# Patient Record
Sex: FEMALE | Race: WHITE | Employment: FULL TIME | URBAN - METROPOLITAN AREA
[De-identification: names, ages, dates, MRNs, and addresses within clinical notes are randomized per-mention and may not be internally consistent; named-entity substitution may affect disease eponyms.]

---

## 2022-03-18 PROBLEM — J45.20 MILD INTERMITTENT ASTHMA WITHOUT COMPLICATION: Status: ACTIVE | Noted: 2017-04-18

## 2022-03-19 PROBLEM — J30.9 ALLERGIC RHINITIS: Status: ACTIVE | Noted: 2017-04-18

## 2022-06-28 ENCOUNTER — HOSPITAL ENCOUNTER (OUTPATIENT)
Dept: LAB | Age: 32
Discharge: HOME | End: 2022-07-01

## 2022-06-28 ENCOUNTER — HOSPITAL ENCOUNTER (OUTPATIENT)
Dept: LAB | Age: 32
Discharge: HOME | End: 2022-07-01
Payer: COMMERCIAL

## 2022-06-28 ENCOUNTER — OFFICE VISIT (OUTPATIENT)
Dept: OCCUPATIONAL MEDICINE | Age: 32
End: 2022-06-28

## 2022-06-28 VITALS
SYSTOLIC BLOOD PRESSURE: 114 MMHG | WEIGHT: 131.4 LBS | OXYGEN SATURATION: 96 % | BODY MASS INDEX: 21.89 KG/M2 | HEART RATE: 102 BPM | HEIGHT: 65 IN | DIASTOLIC BLOOD PRESSURE: 64 MMHG | TEMPERATURE: 98.8 F | RESPIRATION RATE: 12 BRPM

## 2022-06-28 DIAGNOSIS — R68.89 FLU-LIKE SYMPTOMS: ICD-10-CM

## 2022-06-28 DIAGNOSIS — J02.9 SORE THROAT: Primary | ICD-10-CM

## 2022-06-28 LAB
BASOPHILS # BLD: 0 K/UL (ref 0–0.2)
BASOPHILS # BLD: 0 K/UL (ref 0–0.2)
BASOPHILS NFR BLD: 0 % (ref 0–2)
BASOPHILS NFR BLD: 0 % (ref 0–2)
DIFFERENTIAL METHOD BLD: ABNORMAL
DIFFERENTIAL METHOD BLD: ABNORMAL
EOSINOPHIL # BLD: 0 K/UL (ref 0–0.8)
EOSINOPHIL # BLD: 0 K/UL (ref 0–0.8)
EOSINOPHIL NFR BLD: 0 % (ref 0.5–7.8)
EOSINOPHIL NFR BLD: 0 % (ref 0.5–7.8)
ERYTHROCYTE [DISTWIDTH] IN BLOOD BY AUTOMATED COUNT: 14.3 % (ref 11.9–14.6)
ERYTHROCYTE [DISTWIDTH] IN BLOOD BY AUTOMATED COUNT: 14.4 % (ref 11.9–14.6)
GROUP A STREP ANTIGEN, POC: NEGATIVE
HCT VFR BLD AUTO: 41.1 % (ref 35.8–46.3)
HCT VFR BLD AUTO: 41.4 % (ref 35.8–46.3)
HETEROPH AB BLD QL IA: NEGATIVE
HGB BLD-MCNC: 13.6 G/DL (ref 11.7–15.4)
HGB BLD-MCNC: 13.6 G/DL (ref 11.7–15.4)
IMM GRANULOCYTES # BLD AUTO: 0 K/UL (ref 0–0.5)
IMM GRANULOCYTES # BLD AUTO: 0 K/UL (ref 0–0.5)
IMM GRANULOCYTES NFR BLD AUTO: 0 % (ref 0–5)
IMM GRANULOCYTES NFR BLD AUTO: 0 % (ref 0–5)
INFLUENZA A ANTIGEN, POC: NEGATIVE
INFLUENZA B ANTIGEN, POC: NEGATIVE
LYMPHOCYTES # BLD: 0.8 K/UL (ref 0.5–4.6)
LYMPHOCYTES # BLD: 0.9 K/UL (ref 0.5–4.6)
LYMPHOCYTES NFR BLD: 8 % (ref 13–44)
LYMPHOCYTES NFR BLD: 9 % (ref 13–44)
MCH RBC QN AUTO: 28 PG (ref 26.1–32.9)
MCH RBC QN AUTO: 28.2 PG (ref 26.1–32.9)
MCHC RBC AUTO-ENTMCNC: 32.9 G/DL (ref 31.4–35)
MCHC RBC AUTO-ENTMCNC: 33.1 G/DL (ref 31.4–35)
MCV RBC AUTO: 85.1 FL (ref 79.6–97.8)
MCV RBC AUTO: 85.4 FL (ref 79.6–97.8)
MONOCYTES # BLD: 0.8 K/UL (ref 0.1–1.3)
MONOCYTES # BLD: 0.9 K/UL (ref 0.1–1.3)
MONOCYTES NFR BLD: 8 % (ref 4–12)
MONOCYTES NFR BLD: 8 % (ref 4–12)
MONONUCLEOSIS SCREEN POC: POSITIVE
NEUTS SEG # BLD: 8.5 K/UL (ref 1.7–8.2)
NEUTS SEG # BLD: 8.6 K/UL (ref 1.7–8.2)
NEUTS SEG NFR BLD: 83 % (ref 43–78)
NEUTS SEG NFR BLD: 84 % (ref 43–78)
NRBC # BLD: 0 K/UL (ref 0–0.2)
NRBC # BLD: 0 K/UL (ref 0–0.2)
PLATELET # BLD AUTO: 255 K/UL (ref 150–450)
PLATELET # BLD AUTO: 256 K/UL (ref 150–450)
PMV BLD AUTO: 10.4 FL (ref 9.4–12.3)
PMV BLD AUTO: 10.6 FL (ref 9.4–12.3)
RBC # BLD AUTO: 4.83 M/UL (ref 4.05–5.2)
RBC # BLD AUTO: 4.85 M/UL (ref 4.05–5.2)
VALID INTERNAL CONTROL, POC: YES
VALID INTERNAL CONTROL: YES
WBC # BLD AUTO: 10.2 K/UL (ref 4.3–11.1)
WBC # BLD AUTO: 10.4 K/UL (ref 4.3–11.1)

## 2022-06-28 PROCEDURE — 86308 HETEROPHILE ANTIBODY SCREEN: CPT | Performed by: NURSE PRACTITIONER

## 2022-06-28 PROCEDURE — 87804 INFLUENZA ASSAY W/OPTIC: CPT | Performed by: NURSE PRACTITIONER

## 2022-06-28 PROCEDURE — 85025 COMPLETE CBC W/AUTO DIFF WBC: CPT

## 2022-06-28 PROCEDURE — 99214 OFFICE O/P EST MOD 30 MIN: CPT | Performed by: NURSE PRACTITIONER

## 2022-06-28 PROCEDURE — 87430 STREP A AG IA: CPT | Performed by: NURSE PRACTITIONER

## 2022-06-28 PROCEDURE — 86308 HETEROPHILE ANTIBODY SCREEN: CPT

## 2022-06-28 ASSESSMENT — ENCOUNTER SYMPTOMS
RHINORRHEA: 1
SORE THROAT: 1
CHOKING: 0
SINUS PAIN: 0
APNEA: 0
EYES NEGATIVE: 1
DIARRHEA: 0
STRIDOR: 0
SHORTNESS OF BREATH: 0
WHEEZING: 0
TROUBLE SWALLOWING: 0
CHEST TIGHTNESS: 0
NAUSEA: 0
VOICE CHANGE: 0
COUGH: 1
FACIAL SWELLING: 0
CONSTIPATION: 0
SINUS PRESSURE: 0

## 2022-06-28 NOTE — PROGRESS NOTES
HISTORY OF PRESENT ILLNESS  Allison Martinez is a 28 y.o. female     Presenting today with flu like symptoms. 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 On June 15th, 2022 (13 days ago) tested POSITIVE for COVID19 via home antigen test, asymptomatic at the time, tested after exposed to her fiance who tested POSITIVE on PCR. Note that over a week prior to her POSITIVE home test, she has laryngitis and a cough that resolved with a brief course of prednisone. She tested every 3 days for COVID during brief illness, NEGATIVE several times. Last night, started with new, sudden abrupt onset of fever of 102, body aches, chills, fatigue, congestion, sore throat, and cough, taking Ibuprofen, helping with fever, fever resolved this morning, still with chills and aches. She completed a virtual visit with her PCP and was Rx'd Doxycycline. Came here for further evaluation. Review of Systems   Constitutional: Positive for chills, fatigue and fever. HENT: Positive for congestion, rhinorrhea and sore throat. Negative for ear discharge, ear pain, facial swelling, hearing loss, mouth sores, nosebleeds, sinus pressure, sinus pain, sneezing, tinnitus, trouble swallowing and voice change. Eyes: Negative. Respiratory: Positive for cough. Negative for apnea, choking, chest tightness, shortness of breath, wheezing and stridor. Cardiovascular: Negative for chest pain. Gastrointestinal: Negative for constipation, diarrhea and nausea. Musculoskeletal: Positive for myalgias. Neurological: Positive for headaches. Negative for dizziness. History reviewed. No pertinent past medical history.   Social History     Socioeconomic History    Marital status: Single     Spouse name: Not on file    Number of children: Not on file    Years of education: Not on file    Highest education level: Not on file   Occupational History    Not on file   Tobacco Use    Smoking status: Never Smoker    Smokeless tobacco: Never Used   Substance and Sexual Activity    Alcohol use: Yes    Drug use: No    Sexual activity: Not on file     Comment: Mirena 6/8/17   Other Topics Concern    Not on file   Social History Narrative    Not on file     Social Determinants of Health     Financial Resource Strain:     Difficulty of Paying Living Expenses: Not on file   Food Insecurity:     Worried About Running Out of Food in the Last Year: Not on file    Coreen of Food in the Last Year: Not on file   Transportation Needs:     Lack of Transportation (Medical): Not on file    Lack of Transportation (Non-Medical):  Not on file   Physical Activity:     Days of Exercise per Week: Not on file    Minutes of Exercise per Session: Not on file   Stress:     Feeling of Stress : Not on file   Social Connections:     Frequency of Communication with Friends and Family: Not on file    Frequency of Social Gatherings with Friends and Family: Not on file    Attends Anabaptism Services: Not on file    Active Member of [address] [place] [address] or Organizations: Not on file    Attends Club or Organization Meetings: Not on file    Marital Status: Not on file   Intimate Partner Violence:     Fear of Current or Ex-Partner: Not on file    Emotionally Abused: Not on file    Physically Abused: Not on file    Sexually Abused: Not on file   Housing Stability:     Unable to Pay for Housing in the Last Year: Not on file    Number of [place] in the Last Year: Not on file    Unstable Housing in the Last Year: Not on file     Family History   Problem Relation Age of Onset    Breast Cancer Neg Hx     Ovarian Cancer Neg Hx     Cancer Father         prostate    Colon Cancer Neg Hx      Past Surgical History:   Procedure Laterality Date    CHOLECYSTECTOMY, LAPAROSCOPIC  2009    WISDOM TOOTH EXTRACTION       Immunization History   Administered Date(s) Administered    COVID-19, Antonia Cho, Primary or Immunocompromised, PF, 100mcg/0.5mL 01/05/2022    Influenza Joss Starch 09/24/2018  Influenza, Quadv, IM, PF (6 mo and older Fluzone, Flulaval, Fluarix, and 3 yrs and older Afluria) 09/11/2017     Current Outpatient Medications   Medication Sig Dispense Refill    albuterol sulfate  (90 Base) MCG/ACT inhaler 2 inhalations q6h prn wheezing      fluticasone (FLONASE) 50 MCG/ACT nasal spray 1 spray IEN bid Indications: Allergic Rhinitis      levonorgestrel (MIRENA) IUD 52 mg Placed June, 2016      montelukast (SINGULAIR) 10 MG tablet Take 10 mg by mouth daily      spironolactone (ALDACTONE) 25 MG tablet Take by mouth daily (Patient not taking: Reported on 6/28/2022)       No current facility-administered medications for this visit. Allergies   Allergen Reactions    Amoxicillin-Pot Clavulanate Hives       Physical Exam  Vitals reviewed. Constitutional:       General: She is awake. She is not in acute distress. Appearance: Normal appearance. She is well-developed and well-groomed. She is not ill-appearing or toxic-appearing. HENT:      Head: Normocephalic and atraumatic. Jaw: There is normal jaw occlusion. Right Ear: Ear canal and external ear normal. Tympanic membrane is scarred. Left Ear: Tympanic membrane, ear canal and external ear normal.      Nose: Mucosal edema and congestion present. Right Turbinates: Enlarged. Not swollen or pale. Left Turbinates: Enlarged. Not swollen or pale. Right Sinus: No maxillary sinus tenderness or frontal sinus tenderness. Left Sinus: No maxillary sinus tenderness or frontal sinus tenderness. Mouth/Throat:      Mouth: Mucous membranes are moist.      Pharynx: Oropharynx is clear. Uvula midline. Posterior oropharyngeal erythema present. No pharyngeal swelling, oropharyngeal exudate or uvula swelling. Tonsils: No tonsillar exudate or tonsillar abscesses. 1+ on the right. 1+ on the left. Eyes:      Extraocular Movements: Extraocular movements intact.       Conjunctiva/sclera: Conjunctivae normal. Pupils: Pupils are equal, round, and reactive to light. Neck:      Trachea: Trachea and phonation normal.   Cardiovascular:      Rate and Rhythm: Normal rate and regular rhythm. Pulses: Normal pulses. Heart sounds: Normal heart sounds, S1 normal and S2 normal.   Pulmonary:      Effort: Pulmonary effort is normal.      Breath sounds: Normal breath sounds and air entry. Musculoskeletal:         General: Normal range of motion. Cervical back: Full passive range of motion without pain, normal range of motion and neck supple. Lymphadenopathy:      Head:      Right side of head: No submental, submandibular, tonsillar, preauricular, posterior auricular or occipital adenopathy. Left side of head: No submental, submandibular, tonsillar, preauricular, posterior auricular or occipital adenopathy. Cervical: Cervical adenopathy present. Right cervical: Superficial cervical adenopathy present. Skin:     General: Skin is warm and dry. Capillary Refill: Capillary refill takes less than 2 seconds. Neurological:      General: No focal deficit present. Mental Status: She is alert and oriented to person, place, and time. Psychiatric:         Mood and Affect: Mood normal.         Behavior: Behavior normal. Behavior is cooperative. Thought Content:  Thought content normal.          /64 (Site: Left Upper Arm, Position: Sitting, Cuff Size: Medium Adult)   Pulse (!) 102   Temp 98.8 °F (37.1 °C) (Temporal)   Resp 12   Ht 5' 5\" (1.651 m)   Wt 131 lb 6.4 oz (59.6 kg)   SpO2 96%   BMI 21.87 kg/m²     Results for orders placed or performed in visit on 06/28/22   AMB POC RAPID INFLUENZA TEST   Result Value Ref Range    VALID INTERNAL CONTROL, POC yes     Influenza A Antigen, POC Negative Negative    Influenza B Antigen, POC Negative Negative   AMB POC RAPID STREP TEST   Result Value Ref Range    Group A Strep Antigen, POC Negative Negative   AMB POC MONO TEST   Result Value Ref Range    VALID INTERNAL CONTROL yes     MONONUCLEOSIS SCREEN POC positive      . ASSESSMENT and Grady Pelaez was seen today for pharyngitis. Diagnoses and all orders for this visit:    Sore throat  -     AMB POC RAPID INFLUENZA TEST  -     AMB POC RAPID STREP TEST    Flu-like symptoms  -     AMB POC RAPID INFLUENZA TEST  -     AMB POC MONO TEST  -     Mononucleosis Screen; Future  -     CBC with Auto Differential; Future      Rapid Strep and Flu NEGATIVE. The Mono test has a faint line, unsure if POSITIVE or NEGATIVE. Complete CBC and Mono screen for confirmation. Her last [facility] test was POSITIVE on 6/15 (13 days ago), asymptomatic at the time. Possibly now active? ? Recommended that she take another Home Antigen test that I cannot offer here at this [facility]. If POSITIVE, follow [facility] isolation, today starting day 1 of symptom onset. Patient Education:  May use cool-mist humidifier/Vaporizer at bedside/living quarters. Recommended saline rinses with OceanMist spray or using NettiPot. Encouraged increase fluid intake for adequate hydration. Reviewed good hand hygiene. Tylenol/Ibuprofen for aches/pains. Throat lozenges, honey, or warm salt water gargles for sore throat. Informed symptoms should last for 5-7 days but may last up to 2 weeks. Cough may persist after cold symptoms resolve. Risks and benefits of medications reviewed with patient . Follow up: 5 days if not any better or worse or development of high fever. Seek ER care for chest pain or SOB. Patient voices understanding and agrees with the plan of care as described above.      ---------------    Addendum  6/28/22 @ 2:14pm.    Mono screen NEGATIVE. Confirmed NO MONO.  CBC unremarkable. Called and reviewed results @ 2:30pm.  She reported taking  Another home COVID test and NEGATIVE. Has Doxy from her PCP to start. Move forward with starting and follow up as directed above.

## 2022-06-29 ENCOUNTER — TELEPHONE (OUTPATIENT)
Dept: OCCUPATIONAL MEDICINE | Age: 32
End: 2022-06-29

## 2022-06-29 NOTE — TELEPHONE ENCOUNTER
Courtesy Follow Up Call:    Left message for patient to call back with any questions or concerns regarding visit yesterday.

## 2022-09-28 ENCOUNTER — OFFICE VISIT (OUTPATIENT)
Dept: OBGYN CLINIC | Age: 32
End: 2022-09-28
Payer: COMMERCIAL

## 2022-09-28 VITALS
HEIGHT: 65 IN | SYSTOLIC BLOOD PRESSURE: 110 MMHG | BODY MASS INDEX: 22.33 KG/M2 | DIASTOLIC BLOOD PRESSURE: 60 MMHG | WEIGHT: 134 LBS

## 2022-09-28 DIAGNOSIS — Z30.432 ENCOUNTER FOR IUD REMOVAL: ICD-10-CM

## 2022-09-28 DIAGNOSIS — Z31.69 ENCOUNTER FOR PRECONCEPTION CONSULTATION: Primary | ICD-10-CM

## 2022-09-28 PROCEDURE — 58301 REMOVE INTRAUTERINE DEVICE: CPT | Performed by: OBSTETRICS & GYNECOLOGY

## 2022-09-28 PROCEDURE — 99213 OFFICE O/P EST LOW 20 MIN: CPT | Performed by: OBSTETRICS & GYNECOLOGY

## 2022-09-28 ASSESSMENT — ENCOUNTER SYMPTOMS
GASTROINTESTINAL NEGATIVE: 1
ALLERGIC/IMMUNOLOGIC NEGATIVE: 1
EYES NEGATIVE: 1
RESPIRATORY NEGATIVE: 1

## 2022-09-28 NOTE — PROGRESS NOTES
Name: Nick Lie  Date: 2022  YOB: 1990  LMP: No LMP recorded. (Menstrual status: IUD). Sherice Olivarez is a 33 y.o.   who is here for preg planning and iud removal     Birth control: iud placed in   Menstrual status: none  Gyn Surgery: none    Health Maintenance:  Pap Smear: last pap in 2022, pathology negative  Any history of abnormal pap smear? no  Mammo: never done  HPV vaccine: completed  Colonoscopy: not ever done  Dexa scan: not needed    Review of Systems   Constitutional: Negative. HENT: Negative. Eyes: Negative. Respiratory: Negative. Cardiovascular: Negative. Gastrointestinal: Negative. Endocrine: Negative. Genitourinary: Negative. Musculoskeletal: Negative. Skin: Negative. Allergic/Immunologic: Negative. Neurological: Negative. Hematological: Negative. Psychiatric/Behavioral: Negative. Negative for self-injury and suicidal ideas. No past medical history on file. Past Surgical History:  2009: CHOLECYSTECTOMY, LAPAROSCOPIC  No date: WISDOM TOOTH EXTRACTION       Current Outpatient Medications:     albuterol sulfate  (90 Base) MCG/ACT inhaler, 2 inhalations q6h prn wheezing, Disp: , Rfl:     fluticasone (FLONASE) 50 MCG/ACT nasal spray, 1 spray IEN bid Indications: Allergic Rhinitis, Disp: , Rfl:     levonorgestrel (MIRENA) IUD 52 mg, Placed , Disp: , Rfl:     montelukast (SINGULAIR) 10 MG tablet, Take 10 mg by mouth daily, Disp: , Rfl:     Family Cancer History:   Cancer-related family history includes Cancer in her father. There is no history of Breast Cancer, Ovarian Cancer, or Colon Cancer. Social History:  reports that she has never smoked. She has never used smokeless tobacco. She reports current alcohol use. She reports that she does not use drugs. Ob History:  No obstetric history on file. Sexual History:  reports being sexually active.  She reports using the following methods of birth control/protection: Condom and I.U.D..    PHYSICAL EXAM:  Vitals:  height is 5' 5\" (1.651 m) and weight is 134 lb (60.8 kg). Body mass index is 22.3 kg/m². Physical Exam  Constitutional:       General: She is awake. She is not in acute distress. Appearance: Normal appearance. She is not ill-appearing. HENT:      Head: Normocephalic and atraumatic. Eyes:      Conjunctiva/sclera: Conjunctivae normal.   Cardiovascular:      Rate and Rhythm: Normal rate. Pulmonary:      Effort: Pulmonary effort is normal.   Musculoskeletal:         General: Normal range of motion. Cervical back: Normal range of motion. Skin:     General: Skin is warm and dry. Neurological:      General: No focal deficit present. Mental Status: She is alert and oriented to person, place, and time. Motor: Motor function is intact. Coordination: Coordination is intact. Psychiatric:         Behavior: Behavior normal.         Cognition and Memory: Cognition normal.         Judgment: Judgment normal.      PROCEDURE IUD REMOVAL  Speculum placed  String identified and grasped with shireen clamp  Easily removed      Assessment: 33 y.o. , No obstetric history on file. , here for  pregnancy planning and IUD removal  Discussed vitamins, timing, exercise, etoh.   IUD removed

## 2023-01-18 ENCOUNTER — OFFICE VISIT (OUTPATIENT)
Dept: OBGYN CLINIC | Age: 33
End: 2023-01-18
Payer: COMMERCIAL

## 2023-01-18 VITALS
WEIGHT: 136 LBS | DIASTOLIC BLOOD PRESSURE: 84 MMHG | SYSTOLIC BLOOD PRESSURE: 122 MMHG | BODY MASS INDEX: 22.66 KG/M2 | HEIGHT: 65 IN

## 2023-01-18 DIAGNOSIS — R45.86 EMOTIONAL LABILITY: ICD-10-CM

## 2023-01-18 DIAGNOSIS — Z12.4 SCREENING FOR CERVICAL CANCER: ICD-10-CM

## 2023-01-18 DIAGNOSIS — Z01.419 WELL WOMAN EXAM WITH ROUTINE GYNECOLOGICAL EXAM: Primary | ICD-10-CM

## 2023-01-18 PROCEDURE — 99395 PREV VISIT EST AGE 18-39: CPT | Performed by: OBSTETRICS & GYNECOLOGY

## 2023-01-18 ASSESSMENT — ENCOUNTER SYMPTOMS
GASTROINTESTINAL NEGATIVE: 1
EYES NEGATIVE: 1
ALLERGIC/IMMUNOLOGIC NEGATIVE: 1
RESPIRATORY NEGATIVE: 1

## 2023-01-18 NOTE — PROGRESS NOTES
Name: Maritza Pittman  Date: 2023  YOB: 1990  LMP: No LMP recorded. (Menstrual status: IUD). Gucci Shukla is a 33 y.o.   who is here for a annual exam. Struggling with \" emotional and hormonal changes after IUD removed\" she is tearful. She denies depression, SI. Birth control: none, wants to be pregnant  Menstrual status: regular cycles  Gyn Surgery: none    Health Maintenance:  Pap Smear: last pap in 2022, pathology negative  Any history of abnormal pap smear? no  Mammo: never done  HPV vaccine: completed  Colonoscopy: not ever done  Dexa scan: not applicable    Review of Systems   Constitutional: Negative. HENT: Negative. Eyes: Negative. Respiratory: Negative. Cardiovascular: Negative. Gastrointestinal: Negative. Endocrine: Negative. Genitourinary: Negative. Musculoskeletal: Negative. Skin: Negative. Allergic/Immunologic: Negative. Neurological: Negative. Hematological: Negative. Psychiatric/Behavioral: Negative. Negative for self-injury and suicidal ideas. No past medical history on file. Past Surgical History:  2009: CHOLECYSTECTOMY, LAPAROSCOPIC  No date: WISDOM TOOTH EXTRACTION       Current Outpatient Medications:     albuterol sulfate  (90 Base) MCG/ACT inhaler, 2 inhalations q6h prn wheezing, Disp: , Rfl:     fluticasone (FLONASE) 50 MCG/ACT nasal spray, 1 spray IEN bid Indications: Allergic Rhinitis, Disp: , Rfl:     levonorgestrel (MIRENA) IUD 52 mg, Placed , Disp: , Rfl:     montelukast (SINGULAIR) 10 MG tablet, Take 10 mg by mouth daily, Disp: , Rfl:     Family Cancer History:   Cancer-related family history includes Cancer in her father. There is no history of Breast Cancer, Ovarian Cancer, or Colon Cancer. Social History:  reports that she has never smoked. She has never used smokeless tobacco. She reports current alcohol use. She reports that she does not use drugs.     Ob History:  No obstetric history on file.       Sexual History:  reports being sexually active. She reports using the following methods of birth control/protection: Condom and I.U.D..    PHYSICAL EXAM:  Vitals:  vitals were not taken for this visit.  There is no height or weight on file to calculate BMI.  Physical Exam  Constitutional:       Appearance: Normal appearance.   HENT:      Head: Normocephalic and atraumatic.   Eyes:      Conjunctiva/sclera: Conjunctivae normal.   Neck:      Thyroid: No thyroid mass.   Cardiovascular:      Rate and Rhythm: Normal rate.   Pulmonary:      Effort: Pulmonary effort is normal.   Abdominal:      Palpations: Abdomen is soft. There is no hepatomegaly, splenomegaly or pulsatile mass.      Tenderness: There is no right CVA tenderness, left CVA tenderness, guarding or rebound.   Musculoskeletal:         General: Normal range of motion.   Lymphadenopathy:      Cervical: No cervical adenopathy.   Skin:     General: Skin is warm and dry.   Neurological:      General: No focal deficit present.      Mental Status: She is alert and oriented to person, place, and time.   Psychiatric:         Attention and Perception: Attention and perception normal.         Speech: Speech normal.         Behavior: Behavior normal.         Cognition and Memory: Cognition normal.      Breast exam is normal in right and left breast without masses, lesions or discharge.   The external genitalia is normal.  Urethral meatus is normal. Vagina is pink and rugated. The bladder and urethra are normal .  The cervix is normal.  The uterus is normal. The ovaries are normal.       Assessment: 32 y.o. , No obstetric history on file., here for annual exam  well woman: pap is done  2.   desire for pregnancy: IUD out 4 months.  3 normal cycles  3. Emotional lability: discussed MOA of IUD and non systemic hormones.  She will see therapist, does not want to go on ay meds.  Follow up in 6 weeks      Giselle Galvan MD